# Patient Record
Sex: MALE | Race: OTHER | URBAN - METROPOLITAN AREA
[De-identification: names, ages, dates, MRNs, and addresses within clinical notes are randomized per-mention and may not be internally consistent; named-entity substitution may affect disease eponyms.]

---

## 2017-02-08 NOTE — PATIENT DISCUSSION
GLAUCOMA SUSPECT, OU : INTRAOCULAR PRESSURE AND OCT  IS WITHIN ACCEPTABLE LIMITS. NO DROP THERAPY NEEDED AT THIS TIME.

## 2020-03-12 NOTE — PATIENT DISCUSSION
POSTERIOR CAPSULAR FIBROSIS, OU:  VISUALLY SIGNIFICANT. OPTION OF YAG LASER VERSUS UPDATING GLASSES VERSUS FOLLOWING DISCUSSED.   RBA'S DISCUSSED, PATIENT UNDERSTANDS AND DESIRES YAG LASER TO INCREASE VISION FOR WATCHING TV.  SCHEDULE YAG LASER OD THEN OS

## 2021-03-04 NOTE — PATIENT DISCUSSION
GLAUCOMA SUSPECT, OU : INTRAOCULAR PRESSURE  IS WITHIN ACCEPTABLE LIMITS. OCT SHOWED THINNING OS. GONIO DONE TODAY TO [address]. NO DROP THERAPY NEEDED AT THIS TIME. RETURN FOR GLAUCOMA TESTING.

## 2021-09-03 NOTE — PATIENT DISCUSSION
GLAUCOMA SUSPECT, OU : INTRAOCULAR PRESSURE AND HVF IS WITHIN ACCEPTABLE LIMITS. NO DROP THERAPY NEEDED AT THIS TIME. PHOTOS TO DOCUMENT ONH.

## 2021-09-03 NOTE — PATIENT DISCUSSION
DIPLOPIA ONLY IN LEFT GAZE X 1 YEAR: IF DOUBLE VISION PERSISTS/IS BOTHERSOME DISCUSSED REFERRAL TO DR. El Elaine, PT DECLINES AT THIS TIME.

## 2021-09-10 ENCOUNTER — NEW PATIENT ROUTINE/VISION (OUTPATIENT)
Dept: URBAN - METROPOLITAN AREA CLINIC 49 | Facility: CLINIC | Age: 41
End: 2021-09-10

## 2021-09-10 DIAGNOSIS — H52.4: ICD-10-CM

## 2021-09-10 DIAGNOSIS — D23.111: ICD-10-CM

## 2021-09-10 DIAGNOSIS — Z01.01: ICD-10-CM

## 2021-09-10 DIAGNOSIS — H04.123: ICD-10-CM

## 2021-09-10 PROCEDURE — 92004 COMPRE OPH EXAM NEW PT 1/>: CPT

## 2021-09-10 PROCEDURE — 92015 DETERMINE REFRACTIVE STATE: CPT

## 2021-09-10 ASSESSMENT — VISUAL ACUITY
OD_SC: 20/20
OD_GLARE: 20/20
OS_SC: 20/20
OS_SC: J1+
OU_SC: J1+
OS_GLARE: 20/20
OD_SC: J1+

## 2021-09-10 ASSESSMENT — TONOMETRY
OS_IOP_MMHG: 15
OD_IOP_MMHG: 15

## 2023-09-26 ENCOUNTER — COMPREHENSIVE EXAM (OUTPATIENT)
Dept: URBAN - METROPOLITAN AREA CLINIC 49 | Facility: LOCATION | Age: 43
End: 2023-09-26

## 2023-09-26 DIAGNOSIS — Z01.00: ICD-10-CM

## 2023-09-26 DIAGNOSIS — H04.123: ICD-10-CM

## 2023-09-26 DIAGNOSIS — H52.4: ICD-10-CM

## 2023-09-26 PROCEDURE — 92014 COMPRE OPH EXAM EST PT 1/>: CPT

## 2023-09-26 PROCEDURE — 92015 DETERMINE REFRACTIVE STATE: CPT

## 2023-09-26 ASSESSMENT — KERATOMETRY
OS_K2POWER_DIOPTERS: 44.75
OS_K1POWER_DIOPTERS: 44.00
OD_K1POWER_DIOPTERS: 44.00
OD_AXISANGLE2_DEGREES: 105
OS_AXISANGLE2_DEGREES: 86
OD_AXISANGLE_DEGREES: 15
OS_AXISANGLE_DEGREES: 176
OD_K2POWER_DIOPTERS: 44.50

## 2023-09-26 ASSESSMENT — VISUAL ACUITY
OS_SC: 20/20
OD_SC: 20/20
OU_SC: J1 @ 16"

## 2023-09-26 ASSESSMENT — TONOMETRY
OS_IOP_MMHG: 15
OD_IOP_MMHG: 16